# Patient Record
Sex: MALE | Race: BLACK OR AFRICAN AMERICAN | NOT HISPANIC OR LATINO | Employment: FULL TIME | URBAN - METROPOLITAN AREA
[De-identification: names, ages, dates, MRNs, and addresses within clinical notes are randomized per-mention and may not be internally consistent; named-entity substitution may affect disease eponyms.]

---

## 2021-03-19 PROBLEM — M47.812 CERVICAL SPONDYLOSIS: Status: ACTIVE | Noted: 2021-03-19

## 2021-05-11 PROBLEM — G56.03 CARPAL TUNNEL SYNDROME, BILATERAL: Status: ACTIVE | Noted: 2021-05-11

## 2021-08-12 PROBLEM — M75.102 ROTATOR CUFF TEAR ARTHROPATHY OF LEFT SHOULDER: Status: ACTIVE | Noted: 2021-08-12

## 2021-08-12 PROBLEM — Z79.52 LONG TERM (CURRENT) USE OF SYSTEMIC STEROIDS: Status: ACTIVE | Noted: 2021-08-12

## 2021-08-12 PROBLEM — M12.812 ROTATOR CUFF TEAR ARTHROPATHY OF LEFT SHOULDER: Status: ACTIVE | Noted: 2021-08-12

## 2021-08-12 PROBLEM — M35.3 PMR (POLYMYALGIA RHEUMATICA): Status: ACTIVE | Noted: 2021-08-12

## 2021-08-23 PROBLEM — Z72.0 TOBACCO ABUSE: Status: ACTIVE | Noted: 2021-08-23

## 2021-09-13 ENCOUNTER — CLINICAL SUPPORT (OUTPATIENT)
Dept: SMOKING CESSATION | Facility: CLINIC | Age: 61
End: 2021-09-13
Payer: COMMERCIAL

## 2021-09-13 DIAGNOSIS — F17.200 NICOTINE DEPENDENCE: Primary | ICD-10-CM

## 2021-09-13 PROCEDURE — 99404 PR PREVENT COUNSEL,INDIV,60 MIN: ICD-10-PCS | Mod: S$GLB,,, | Performed by: GENERAL PRACTICE

## 2021-09-13 PROCEDURE — 99404 PREV MED CNSL INDIV APPRX 60: CPT | Mod: S$GLB,,, | Performed by: GENERAL PRACTICE

## 2021-09-13 RX ORDER — IBUPROFEN 200 MG
1 TABLET ORAL DAILY
Qty: 14 PATCH | Refills: 0 | Status: SHIPPED | OUTPATIENT
Start: 2021-09-13 | End: 2021-09-28 | Stop reason: SDUPTHER

## 2021-09-13 RX ORDER — BUPROPION HYDROCHLORIDE 150 MG/1
150 TABLET ORAL DAILY
Qty: 30 TABLET | Refills: 0 | Status: SHIPPED | OUTPATIENT
Start: 2021-09-13 | End: 2021-10-12 | Stop reason: SDUPTHER

## 2021-09-20 ENCOUNTER — CLINICAL SUPPORT (OUTPATIENT)
Dept: SMOKING CESSATION | Facility: CLINIC | Age: 61
End: 2021-09-20
Payer: COMMERCIAL

## 2021-09-20 DIAGNOSIS — F17.200 NICOTINE DEPENDENCE: Primary | ICD-10-CM

## 2021-09-20 PROCEDURE — 99402 PR PREVENT COUNSEL,INDIV,30 MIN: ICD-10-PCS | Mod: S$GLB,,, | Performed by: GENERAL PRACTICE

## 2021-09-20 PROCEDURE — 99402 PREV MED CNSL INDIV APPRX 30: CPT | Mod: S$GLB,,, | Performed by: GENERAL PRACTICE

## 2021-09-27 ENCOUNTER — TELEPHONE (OUTPATIENT)
Dept: SMOKING CESSATION | Facility: CLINIC | Age: 61
End: 2021-09-27

## 2021-09-28 ENCOUNTER — CLINICAL SUPPORT (OUTPATIENT)
Dept: SMOKING CESSATION | Facility: CLINIC | Age: 61
End: 2021-09-28
Payer: COMMERCIAL

## 2021-09-28 DIAGNOSIS — F17.200 NICOTINE DEPENDENCE: Primary | ICD-10-CM

## 2021-09-28 PROCEDURE — 99403 PR PREVENT COUNSEL,INDIV,45 MIN: ICD-10-PCS | Mod: S$GLB,,, | Performed by: GENERAL PRACTICE

## 2021-09-28 PROCEDURE — 99403 PREV MED CNSL INDIV APPRX 45: CPT | Mod: S$GLB,,, | Performed by: GENERAL PRACTICE

## 2021-09-28 RX ORDER — DM/P-EPHED/ACETAMINOPH/DOXYLAM 30-7.5/3
2 LIQUID (ML) ORAL
Qty: 210 LOZENGE | Refills: 0 | Status: SHIPPED | OUTPATIENT
Start: 2021-09-28

## 2021-09-28 RX ORDER — IBUPROFEN 200 MG
1 TABLET ORAL DAILY
Qty: 14 PATCH | Refills: 0 | Status: SHIPPED | OUTPATIENT
Start: 2021-09-28 | End: 2021-10-12 | Stop reason: SDUPTHER

## 2021-10-12 ENCOUNTER — CLINICAL SUPPORT (OUTPATIENT)
Dept: SMOKING CESSATION | Facility: CLINIC | Age: 61
End: 2021-10-12
Payer: COMMERCIAL

## 2021-10-12 DIAGNOSIS — F17.200 NICOTINE DEPENDENCE: Primary | ICD-10-CM

## 2021-10-12 PROCEDURE — 99402 PREV MED CNSL INDIV APPRX 30: CPT | Mod: S$GLB,,, | Performed by: GENERAL PRACTICE

## 2021-10-12 PROCEDURE — 99402 PR PREVENT COUNSEL,INDIV,30 MIN: ICD-10-PCS | Mod: S$GLB,,, | Performed by: GENERAL PRACTICE

## 2021-10-12 RX ORDER — BUPROPION HYDROCHLORIDE 150 MG/1
150 TABLET ORAL DAILY
Qty: 30 TABLET | Refills: 0 | Status: SHIPPED | OUTPATIENT
Start: 2021-10-12 | End: 2021-11-11

## 2021-10-12 RX ORDER — IBUPROFEN 200 MG
1 TABLET ORAL DAILY
Qty: 14 PATCH | Refills: 0 | Status: SHIPPED | OUTPATIENT
Start: 2021-10-12

## 2021-10-28 ENCOUNTER — CLINICAL SUPPORT (OUTPATIENT)
Dept: SMOKING CESSATION | Facility: CLINIC | Age: 61
End: 2021-10-28
Payer: COMMERCIAL

## 2021-10-28 DIAGNOSIS — F17.200 NICOTINE DEPENDENCE: Primary | ICD-10-CM

## 2021-10-28 PROCEDURE — 99402 PREV MED CNSL INDIV APPRX 30: CPT | Mod: S$GLB,,, | Performed by: GENERAL PRACTICE

## 2021-10-28 PROCEDURE — 99402 PR PREVENT COUNSEL,INDIV,30 MIN: ICD-10-PCS | Mod: S$GLB,,, | Performed by: GENERAL PRACTICE

## 2021-11-11 ENCOUNTER — TELEPHONE (OUTPATIENT)
Dept: SMOKING CESSATION | Facility: CLINIC | Age: 61
End: 2021-11-11

## 2021-11-15 ENCOUNTER — TELEPHONE (OUTPATIENT)
Dept: SMOKING CESSATION | Facility: CLINIC | Age: 61
End: 2021-11-15
Payer: COMMERCIAL

## 2021-12-14 PROBLEM — M75.122 NONTRAUMATIC COMPLETE TEAR OF LEFT ROTATOR CUFF: Status: ACTIVE | Noted: 2021-12-14

## 2022-01-11 ENCOUNTER — TELEPHONE (OUTPATIENT)
Dept: SMOKING CESSATION | Facility: CLINIC | Age: 62
End: 2022-01-11
Payer: COMMERCIAL

## 2022-01-11 NOTE — TELEPHONE ENCOUNTER
Attempted to follow up with patient regarding tobacco cessation progress. Patient did not answer. I left a voice message with my contact information.

## 2022-03-17 ENCOUNTER — TELEPHONE (OUTPATIENT)
Dept: SMOKING CESSATION | Facility: CLINIC | Age: 62
End: 2022-03-17
Payer: COMMERCIAL

## 2022-03-17 NOTE — TELEPHONE ENCOUNTER
Spoke with PT's wife about 6 follow up and she stated that she would give him the message as he wants to quit.

## 2022-09-07 ENCOUNTER — TELEPHONE (OUTPATIENT)
Dept: SMOKING CESSATION | Facility: CLINIC | Age: 62
End: 2022-09-07
Payer: COMMERCIAL

## 2022-09-27 ENCOUNTER — CLINICAL SUPPORT (OUTPATIENT)
Dept: SMOKING CESSATION | Facility: CLINIC | Age: 62
End: 2022-09-27
Payer: COMMERCIAL

## 2022-09-27 DIAGNOSIS — F17.200 NICOTINE DEPENDENCE: Primary | ICD-10-CM

## 2022-09-27 PROCEDURE — 99407 PR TOBACCO USE CESSATION INTENSIVE >10 MINUTES: ICD-10-PCS | Mod: S$GLB,,,

## 2022-09-27 PROCEDURE — 99407 BEHAV CHNG SMOKING > 10 MIN: CPT | Mod: S$GLB,,,

## 2022-09-27 PROCEDURE — 99999 PR PBB SHADOW E&M-EST. PATIENT-LVL I: CPT | Mod: PBBFAC,,,

## 2022-09-27 PROCEDURE — 99999 PR PBB SHADOW E&M-EST. PATIENT-LVL I: ICD-10-PCS | Mod: PBBFAC,,,

## 2022-09-27 NOTE — PROGRESS NOTES
Spoke with patient's wife today in regard to smoking cessation progress for 12 month telephone follow up, she states patient is not tobacco free. Wife states she will discuss the program with her .  Informed her of patient's benefit period, future follow up, and contact information if any further help or support is needed. Will complete smart form for 3/6/12 month follow up and resolve Quit attempt #1.